# Patient Record
Sex: FEMALE | URBAN - METROPOLITAN AREA
[De-identification: names, ages, dates, MRNs, and addresses within clinical notes are randomized per-mention and may not be internally consistent; named-entity substitution may affect disease eponyms.]

---

## 2023-03-08 ENCOUNTER — OFFICE VISIT (OUTPATIENT)
Dept: URBAN - METROPOLITAN AREA CLINIC 10 | Facility: CLINIC | Age: 38
End: 2023-03-08
Payer: COMMERCIAL

## 2023-03-08 DIAGNOSIS — H17.12 CENTRAL CORNEAL OPACITY, LEFT EYE: ICD-10-CM

## 2023-03-08 DIAGNOSIS — H16.012 CENTRAL CORNEAL ULCER, LEFT EYE: Primary | ICD-10-CM

## 2023-03-08 DIAGNOSIS — H17.9 UNSPECIFIED CORNEAL SCAR AND OPACITY: ICD-10-CM

## 2023-03-08 PROCEDURE — 92025 CPTRIZED CORNEAL TOPOGRAPHY: CPT | Performed by: OPHTHALMOLOGY

## 2023-03-08 PROCEDURE — 76514 ECHO EXAM OF EYE THICKNESS: CPT | Performed by: OPHTHALMOLOGY

## 2023-03-08 PROCEDURE — 99204 OFFICE O/P NEW MOD 45 MIN: CPT | Performed by: OPHTHALMOLOGY

## 2023-03-08 RX ORDER — ERYTHROMYCIN 5 MG/G
OINTMENT OPHTHALMIC
Qty: 3.5 | Refills: 1 | Status: ACTIVE
Start: 2023-03-08

## 2023-03-08 RX ORDER — PREDNISOLONE ACETATE 10 MG/ML
1 % SUSPENSION/ DROPS OPHTHALMIC
Qty: 5 | Refills: 5 | Status: ACTIVE
Start: 2023-03-08

## 2023-03-08 RX ORDER — VALACYCLOVIR HYDROCHLORIDE 1 G/1
TABLET, FILM COATED ORAL
Qty: 90 | Refills: 1 | Status: ACTIVE
Start: 2023-03-08

## 2023-03-08 ASSESSMENT — INTRAOCULAR PRESSURE
OD: 11
OS: 11

## 2023-03-08 ASSESSMENT — KERATOMETRY: OD: 43.25

## 2023-03-08 NOTE — IMPRESSION/PLAN
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 Plan: H/o pimple on nose with subsequent ocular disease 10/2022. Appears to have been Huchinson's sign along with HZVK OS. Also h/o shingles elsewhere. Start pred TID - watch for worsening if so add topical zirgan or trifluridine. Start Valtrex 1gm TID Start erythro sunita TID. Cirrhosis of liver without ascites, unspecified hepatic cirrhosis type

## 2023-03-22 ENCOUNTER — OFFICE VISIT (OUTPATIENT)
Dept: URBAN - METROPOLITAN AREA CLINIC 10 | Facility: CLINIC | Age: 38
End: 2023-03-22
Payer: COMMERCIAL

## 2023-03-22 DIAGNOSIS — H16.012 CENTRAL CORNEAL ULCER, LEFT EYE: Primary | ICD-10-CM

## 2023-03-22 DIAGNOSIS — H17.12 CENTRAL CORNEAL OPACITY, LEFT EYE: ICD-10-CM

## 2023-03-22 DIAGNOSIS — M32.9 SYSTEMIC LUPUS ERYTHEMATOSUS, UNSPECIFIED: ICD-10-CM

## 2023-03-22 PROCEDURE — 99213 OFFICE O/P EST LOW 20 MIN: CPT | Performed by: OPHTHALMOLOGY

## 2023-03-22 NOTE — IMPRESSION/PLAN
Impression: Systemic lupus erythematosus, unspecified: M32.9.  Plan: Per Dr. Freya Pereira:
pt has been taking 200-400 mg plaquenil for lupus x 10 years
had screening by Dr. Tae Milligan recently
mac OCT: normal macular contour, no signs of toxicity
cont care with rheumatology

## 2023-03-22 NOTE — IMPRESSION/PLAN
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 Plan: H/o pimple on nose with subsequent ocular disease 10/2022. Appears to have been Huchinson's sign along with HZVK OS. improving, vision improving
cont pred TID x 1 week then BID x 3 weeks then qd, hold at this dose 
cont Valtrex 1gm TID x 1 week then BID x 3 weeks then qd, hold at this dose (H/o shingles in the past, immunocompromized dt lupus). 
d/c erythro sunita

## 2023-04-10 ENCOUNTER — OFFICE VISIT (OUTPATIENT)
Dept: URBAN - METROPOLITAN AREA CLINIC 10 | Facility: CLINIC | Age: 38
End: 2023-04-10
Payer: COMMERCIAL

## 2023-04-10 DIAGNOSIS — H16.012 CENTRAL CORNEAL ULCER, LEFT EYE: Primary | ICD-10-CM

## 2023-04-10 PROCEDURE — 99213 OFFICE O/P EST LOW 20 MIN: CPT | Performed by: OPHTHALMOLOGY

## 2023-04-10 ASSESSMENT — INTRAOCULAR PRESSURE: OD: 14

## 2023-04-10 NOTE — IMPRESSION/PLAN
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 H/o pimple on nose with subsequent ocular disease 10/2022. Appears to have been Huchinson's sign along with HZVK OS -- First episode of HSVK
(H/o shingles in the past, immunocompromized dt lupus). Plan: Continues to improve. Decrease pred to qd x 2 weeks then d/c Pt self d/c'd Valtrex dt HA's. Will leave off for now. Explained to pt that scar will continue to fade slowly with time, but may not fully resolve. 
Will have pt return to optom for monitoring, cornea prn

## 2023-05-08 ENCOUNTER — OFFICE VISIT (OUTPATIENT)
Dept: URBAN - METROPOLITAN AREA CLINIC 43 | Facility: CLINIC | Age: 38
End: 2023-05-08
Payer: COMMERCIAL

## 2023-05-08 DIAGNOSIS — H16.012 CENTRAL CORNEAL ULCER, LEFT EYE: Primary | ICD-10-CM

## 2023-05-08 PROCEDURE — 99213 OFFICE O/P EST LOW 20 MIN: CPT | Performed by: OPTOMETRIST

## 2023-05-08 RX ORDER — VALACYCLOVIR HYDROCHLORIDE 1 G/1
TABLET, FILM COATED ORAL
Qty: 90 | Refills: 1 | Status: INACTIVE
Start: 2023-05-08 | End: 2023-05-08

## 2023-05-08 RX ORDER — VALACYCLOVIR HYDROCHLORIDE 1 G/1
TABLET, FILM COATED ORAL
Qty: 90 | Refills: 3 | Status: ACTIVE
Start: 2023-05-08

## 2023-05-08 ASSESSMENT — INTRAOCULAR PRESSURE
OD: 12
OS: 11

## 2023-05-08 NOTE — IMPRESSION/PLAN
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 H/o pimple on nose with subsequent ocular disease 10/2022. Appears to have been Huchinson's sign along with HZVK OS -- First episode of HSVK
(H/o shingles in the past, immunocompromized dt lupus).  Plan: mostly resolved, K scar remains, dwp prophylactic dose of valtrex, ERx'ed 1g valtrex po QD x 1  year, f/u PRN

## 2023-08-16 ENCOUNTER — OFFICE VISIT (OUTPATIENT)
Dept: URBAN - METROPOLITAN AREA CLINIC 43 | Facility: CLINIC | Age: 38
End: 2023-08-16
Payer: COMMERCIAL

## 2023-08-16 DIAGNOSIS — B02.33 ZOSTER KERATITIS: Primary | ICD-10-CM

## 2023-08-16 PROCEDURE — 99214 OFFICE O/P EST MOD 30 MIN: CPT | Performed by: OPTOMETRIST

## 2023-08-16 RX ORDER — PREDNISOLONE ACETATE 10 MG/ML
1 % SUSPENSION/ DROPS OPHTHALMIC
Qty: 5 | Refills: 1 | Status: ACTIVE
Start: 2023-08-16

## 2023-08-16 RX ORDER — VALACYCLOVIR HYDROCHLORIDE 1 G/1
TABLET, FILM COATED ORAL
Qty: 30 | Refills: 1 | Status: ACTIVE
Start: 2023-08-16

## 2023-08-18 ENCOUNTER — OFFICE VISIT (OUTPATIENT)
Dept: URBAN - METROPOLITAN AREA CLINIC 43 | Facility: CLINIC | Age: 38
End: 2023-08-18
Payer: COMMERCIAL

## 2023-08-18 PROCEDURE — 99213 OFFICE O/P EST LOW 20 MIN: CPT | Performed by: OPTOMETRIST

## 2023-08-24 ENCOUNTER — OFFICE VISIT (OUTPATIENT)
Dept: URBAN - METROPOLITAN AREA CLINIC 43 | Facility: CLINIC | Age: 38
End: 2023-08-24
Payer: COMMERCIAL

## 2023-08-24 DIAGNOSIS — B02.33 ZOSTER KERATITIS: Primary | ICD-10-CM

## 2023-08-24 PROCEDURE — 99213 OFFICE O/P EST LOW 20 MIN: CPT | Performed by: OPTOMETRIST

## 2023-08-24 ASSESSMENT — INTRAOCULAR PRESSURE
OD: 11
OS: 11

## 2023-08-30 ENCOUNTER — OFFICE VISIT (OUTPATIENT)
Dept: URBAN - METROPOLITAN AREA CLINIC 43 | Facility: CLINIC | Age: 38
End: 2023-08-30
Payer: COMMERCIAL

## 2023-08-30 DIAGNOSIS — B02.33 ZOSTER KERATITIS: Primary | ICD-10-CM

## 2023-08-30 PROCEDURE — 99213 OFFICE O/P EST LOW 20 MIN: CPT | Performed by: OPTOMETRIST

## 2023-08-30 RX ORDER — VALACYCLOVIR HYDROCHLORIDE 1 G/1
TABLET, FILM COATED ORAL
Qty: 90 | Refills: 3 | Status: ACTIVE
Start: 2023-08-30

## 2024-05-30 ENCOUNTER — OFFICE VISIT (OUTPATIENT)
Dept: URBAN - METROPOLITAN AREA CLINIC 43 | Facility: CLINIC | Age: 39
End: 2024-05-30
Payer: COMMERCIAL

## 2024-05-30 DIAGNOSIS — H04.123 DRY EYE SYNDROME OF BILATERAL LACRIMAL GLANDS: ICD-10-CM

## 2024-05-30 DIAGNOSIS — H43.391 OTHER VITREOUS OPACITIES, RIGHT EYE: Primary | ICD-10-CM

## 2024-05-30 DIAGNOSIS — M32.9 SYSTEMIC LUPUS ERYTHEMATOSUS, UNSPECIFIED: ICD-10-CM

## 2024-05-30 DIAGNOSIS — Z79.899 OTHER LONG TERM DRUG THERAPY: ICD-10-CM

## 2024-05-30 DIAGNOSIS — H17.12 CENTRAL CORNEAL OPACITY, LEFT EYE: ICD-10-CM

## 2024-05-30 PROCEDURE — 99214 OFFICE O/P EST MOD 30 MIN: CPT | Performed by: OPTOMETRIST

## 2024-05-30 PROCEDURE — 92134 CPTRZ OPH DX IMG PST SGM RTA: CPT | Performed by: OPTOMETRIST

## 2024-05-30 ASSESSMENT — INTRAOCULAR PRESSURE
OD: 14
OS: 10

## 2024-05-30 ASSESSMENT — VISUAL ACUITY
OD: 20/20
OS: 20/50

## 2024-06-26 ENCOUNTER — OFFICE VISIT (OUTPATIENT)
Dept: URBAN - METROPOLITAN AREA CLINIC 43 | Facility: CLINIC | Age: 39
End: 2024-06-26
Payer: COMMERCIAL

## 2024-06-26 DIAGNOSIS — H53.40 UNSPECIFIED VISUAL FIELD DEFECTS: ICD-10-CM

## 2024-06-26 DIAGNOSIS — Z79.899 OTHER LONG TERM (CURRENT) DRUG THERAPY: Primary | ICD-10-CM

## 2024-06-26 DIAGNOSIS — M32.9 SYSTEMIC LUPUS ERYTHEMATOSUS, UNSPECIFIED: ICD-10-CM

## 2024-06-26 PROCEDURE — 99214 OFFICE O/P EST MOD 30 MIN: CPT | Performed by: OPTOMETRIST

## 2024-06-26 PROCEDURE — 92083 EXTENDED VISUAL FIELD XM: CPT | Performed by: OPTOMETRIST

## 2024-06-26 ASSESSMENT — INTRAOCULAR PRESSURE
OS: 12
OD: 11

## 2024-07-18 ENCOUNTER — OFFICE VISIT (OUTPATIENT)
Dept: URBAN - METROPOLITAN AREA CLINIC 43 | Facility: CLINIC | Age: 39
End: 2024-07-18
Payer: COMMERCIAL

## 2024-07-18 DIAGNOSIS — H53.40 UNSPECIFIED VISUAL FIELD DEFECTS: Primary | ICD-10-CM

## 2024-07-18 DIAGNOSIS — Z79.899 OTHER LONG TERM (CURRENT) DRUG THERAPY: ICD-10-CM

## 2024-07-18 PROCEDURE — 92083 EXTENDED VISUAL FIELD XM: CPT | Performed by: OPTOMETRIST

## 2024-07-18 PROCEDURE — 99214 OFFICE O/P EST MOD 30 MIN: CPT | Performed by: OPTOMETRIST

## 2024-07-18 ASSESSMENT — INTRAOCULAR PRESSURE
OD: 11
OS: 11

## 2025-06-26 ENCOUNTER — OFFICE VISIT (OUTPATIENT)
Dept: URBAN - METROPOLITAN AREA CLINIC 43 | Facility: CLINIC | Age: 40
End: 2025-06-26
Payer: MEDICARE

## 2025-06-26 DIAGNOSIS — H17.12 CENTRAL CORNEAL OPACITY, LEFT EYE: ICD-10-CM

## 2025-06-26 DIAGNOSIS — M32.9 SYSTEMIC LUPUS ERYTHEMATOSUS, UNSPECIFIED: ICD-10-CM

## 2025-06-26 DIAGNOSIS — H53.40 UNSPECIFIED VISUAL FIELD DEFECTS: ICD-10-CM

## 2025-06-26 DIAGNOSIS — H43.391 OTHER VITREOUS OPACITIES, RIGHT EYE: ICD-10-CM

## 2025-06-26 DIAGNOSIS — Z79.899 OTHER LONG TERM (CURRENT) DRUG THERAPY: Primary | ICD-10-CM

## 2025-06-26 PROCEDURE — 92134 CPTRZ OPH DX IMG PST SGM RTA: CPT | Performed by: OPTOMETRIST

## 2025-06-26 PROCEDURE — 99214 OFFICE O/P EST MOD 30 MIN: CPT | Performed by: OPTOMETRIST

## 2025-06-26 PROCEDURE — 92083 EXTENDED VISUAL FIELD XM: CPT | Performed by: OPTOMETRIST

## 2025-06-26 ASSESSMENT — VISUAL ACUITY
OD: 20/20
OS: 20/40

## 2025-06-26 ASSESSMENT — KERATOMETRY
OS: 43.00
OD: 43.38

## 2025-06-26 ASSESSMENT — INTRAOCULAR PRESSURE
OS: 13
OD: 13